# Patient Record
Sex: FEMALE | Race: WHITE | NOT HISPANIC OR LATINO | Employment: UNEMPLOYED | URBAN - METROPOLITAN AREA
[De-identification: names, ages, dates, MRNs, and addresses within clinical notes are randomized per-mention and may not be internally consistent; named-entity substitution may affect disease eponyms.]

---

## 2024-01-31 ENCOUNTER — OFFICE VISIT (OUTPATIENT)
Dept: FAMILY MEDICINE CLINIC | Facility: CLINIC | Age: 64
End: 2024-01-31
Payer: COMMERCIAL

## 2024-01-31 VITALS
OXYGEN SATURATION: 97 % | HEIGHT: 65 IN | BODY MASS INDEX: 34.16 KG/M2 | HEART RATE: 80 BPM | RESPIRATION RATE: 16 BRPM | SYSTOLIC BLOOD PRESSURE: 120 MMHG | DIASTOLIC BLOOD PRESSURE: 86 MMHG | WEIGHT: 205 LBS | TEMPERATURE: 97.3 F

## 2024-01-31 DIAGNOSIS — Z11.59 NEED FOR HEPATITIS C SCREENING TEST: ICD-10-CM

## 2024-01-31 DIAGNOSIS — E66.09 CLASS 1 OBESITY DUE TO EXCESS CALORIES WITH SERIOUS COMORBIDITY AND BODY MASS INDEX (BMI) OF 34.0 TO 34.9 IN ADULT: ICD-10-CM

## 2024-01-31 DIAGNOSIS — R20.2 NUMBNESS AND TINGLING OF FOOT: ICD-10-CM

## 2024-01-31 DIAGNOSIS — R20.0 NUMBNESS AND TINGLING OF FOOT: ICD-10-CM

## 2024-01-31 DIAGNOSIS — E78.5 DYSLIPIDEMIA: ICD-10-CM

## 2024-01-31 DIAGNOSIS — G47.9 SLEEP DISORDER: ICD-10-CM

## 2024-01-31 DIAGNOSIS — R06.83 SNORING: ICD-10-CM

## 2024-01-31 DIAGNOSIS — E55.9 VITAMIN D DEFICIENCY: ICD-10-CM

## 2024-01-31 DIAGNOSIS — R53.82 CHRONIC FATIGUE: Primary | ICD-10-CM

## 2024-01-31 PROCEDURE — 99204 OFFICE O/P NEW MOD 45 MIN: CPT | Performed by: FAMILY MEDICINE

## 2024-01-31 NOTE — PROGRESS NOTES
Subjective     Rehab Mercedes is a 63 y.o. female.     Chief Complaint   Patient presents with    Establish Care    Pain     At the bottom of both feet     Fatigue    Snoring    Numbness       History of Present Illness     To establish care, discuss the followings ;    She is c/o bilateral foot pain to for > 1 year , pain worse with walking , feels burning sensation , Lt > Rt . Saw Pod in [place] , callus Rx done   No h/o DM   No HIV  No h/o HTN  Fells fatigue and tired.   Snoring with sleep disorder for many years.     Vit D defi; was on weekly suppl    Chronic back pain for > 1 year , dose not radiate , 3-5/10, worse when she over works     Obesity; on HD , taking OZEMPIC for wt loss.     BMI is >= 30 and <35. (Class 1 Obesity). The following options were offered after discussion;: exercise counseling/recommendations and nutrition counseling/recommendations      Labs and documentation from previous PCP / consulting physician has been reviewed          The following portions of the patient's history were reviewed and updated as appropriate: allergies, current medications, past family history, past medical history, past social history, past surgical history, and problem list.        Review of Systems   Musculoskeletal:  Positive for arthralgias and back pain.   Neurological:  Positive for numbness. Negative for weakness.       Vitals:    01/31/24 1425   BP: 120/86   Pulse: 80   Resp: 16   Temp: 97.3 °F (36.3 °C)   SpO2: 97%           01/31/24  1425   Weight: 93 kg (205 lb)         Body mass index is 34.11 kg/m².      Current Outpatient Medications   Medication Sig Dispense Refill    atorvastatin (Lipitor) 20 MG tablet Take 1 tablet by mouth Daily. 90 tablet 2    vitamin D (ERGOCALCIFEROL) 1.25 MG (07453 UT) capsule capsule Take 1 capsule by mouth 1 (One) Time Per Week. 12 capsule 1     No current facility-administered medications for this visit.                Objective   Physical Exam  Vitals and nursing note  reviewed.   Constitutional:       General: She is not in acute distress.     Appearance: She is obese. She is not toxic-appearing.   Cardiovascular:      Rate and Rhythm: Normal rate and regular rhythm.      Pulses:           Dorsalis pedis pulses are 1+ on the right side and 1+ on the left side.      Heart sounds: Normal heart sounds. No murmur heard.  Pulmonary:      Effort: Pulmonary effort is normal. No respiratory distress.      Breath sounds: Normal breath sounds. No stridor. No wheezing or rhonchi.   Musculoskeletal:         General: No swelling, tenderness or deformity.      Right foot: Normal range of motion. No deformity, bunion or Charcot foot.      Left foot: Normal range of motion. No deformity, bunion or Charcot foot.   Feet:      Right foot:      Protective Sensation: 5 sites tested.        Skin integrity: Skin integrity normal.      Toenail Condition: Right toenails are normal.      Left foot:      Protective Sensation: 5 sites tested.  5 sites sensed.      Skin integrity: Skin integrity normal.      Toenail Condition: Left toenails are normal.   Skin:     Findings: No bruising, erythema or lesion.   Neurological:      Mental Status: She is alert and oriented to person, place, and time.   Psychiatric:         Mood and Affect: Mood normal.         Behavior: Behavior normal.         Thought Content: Thought content normal.           Assessment & Plan   Diagnoses and all orders for this visit:    1. Chronic fatigue (Primary)  -     Lipid Panel  -     CBC (No Diff)  -     Comprehensive Metabolic Panel  -     Thyroid Peroxidase Antibody  -     TSH Rfx On Abnormal To Free T4  -     Vitamin B12  -     Hemoglobin A1c    2. Numbness and tingling of foot  -     EMG & Nerve Conduction Test; Future    3. Vitamin D deficiency  -     Vitamin D,25-Hydroxy  -     vitamin D (ERGOCALCIFEROL) 1.25 MG (75782 UT) capsule capsule; Take 1 capsule by mouth 1 (One) Time Per Week.  Dispense: 12 capsule; Refill: 1    4. Need  for hepatitis C screening test  -     Hepatitis C Antibody    5. Sleep disorder  -     Ambulatory Referral to Sleep Medicine    6. Snoring  -     Ambulatory Referral to Sleep Medicine    7. Class 1 obesity due to excess calories with serious comorbidity and body mass index (BMI) of 34.0 to 34.9 in adult  Comments:  discussed diet and wt loss    8. Dyslipidemia  -     atorvastatin (Lipitor) 20 MG tablet; Take 1 tablet by mouth Daily.  Dispense: 90 tablet; Refill: 2      Addendum;  Labs showed elevated TC AND LDL with low Vit D   Discussed with pt about diet , wt loss  Will start on weekly vit d and daily lipitor     Patient was given instructions and counseling regarding her condition or for health maintenance advice.   Please see specific information pulled into the AVS if appropriate.       I have fully discussed the nature of the medical condition(s) risks, complications, management, safe and proper use of medications.   Encouraged medication compliance and the importance of keeping scheduled follow up appointments with me and any other providers.    Patient instructed to follow up with our office for results on any labs/imaging ordered during this visit.    Home care discussed  All questions answered  Patient verbalizes understanding and agrees to treatment plan.     Follow up: Return in about 2 months (around 3/31/2024) for physical.

## 2024-02-01 LAB
25(OH)D3+25(OH)D2 SERPL-MCNC: 19.7 NG/ML (ref 30–100)
ALBUMIN SERPL-MCNC: 4.4 G/DL (ref 3.9–4.9)
ALBUMIN/GLOB SERPL: 1.6 {RATIO} (ref 1.2–2.2)
ALP SERPL-CCNC: 124 IU/L (ref 44–121)
ALT SERPL-CCNC: 31 IU/L (ref 0–32)
AST SERPL-CCNC: 18 IU/L (ref 0–40)
BILIRUB SERPL-MCNC: 0.2 MG/DL (ref 0–1.2)
BUN SERPL-MCNC: 14 MG/DL (ref 8–27)
BUN/CREAT SERPL: 19 (ref 12–28)
CALCIUM SERPL-MCNC: 9.7 MG/DL (ref 8.7–10.3)
CHLORIDE SERPL-SCNC: 103 MMOL/L (ref 96–106)
CHOLEST SERPL-MCNC: 238 MG/DL (ref 100–199)
CO2 SERPL-SCNC: 23 MMOL/L (ref 20–29)
CREAT SERPL-MCNC: 0.72 MG/DL (ref 0.57–1)
EGFRCR SERPLBLD CKD-EPI 2021: 93 ML/MIN/1.73
ERYTHROCYTE [DISTWIDTH] IN BLOOD BY AUTOMATED COUNT: 12.9 % (ref 11.7–15.4)
GLOBULIN SER CALC-MCNC: 2.8 G/DL (ref 1.5–4.5)
GLUCOSE SERPL-MCNC: 80 MG/DL (ref 70–99)
HBA1C MFR BLD: 5.8 % (ref 4.8–5.6)
HCT VFR BLD AUTO: 40.6 % (ref 34–46.6)
HCV IGG SERPL QL IA: NON REACTIVE
HDLC SERPL-MCNC: 46 MG/DL
HGB BLD-MCNC: 13.2 G/DL (ref 11.1–15.9)
LDLC SERPL CALC-MCNC: 161 MG/DL (ref 0–99)
MCH RBC QN AUTO: 28.4 PG (ref 26.6–33)
MCHC RBC AUTO-ENTMCNC: 32.5 G/DL (ref 31.5–35.7)
MCV RBC AUTO: 87 FL (ref 79–97)
PLATELET # BLD AUTO: 192 X10E3/UL (ref 150–450)
POTASSIUM SERPL-SCNC: 4.9 MMOL/L (ref 3.5–5.2)
PROT SERPL-MCNC: 7.2 G/DL (ref 6–8.5)
RBC # BLD AUTO: 4.65 X10E6/UL (ref 3.77–5.28)
SODIUM SERPL-SCNC: 141 MMOL/L (ref 134–144)
THYROPEROXIDASE AB SERPL-ACNC: 9 IU/ML (ref 0–34)
TRIGL SERPL-MCNC: 170 MG/DL (ref 0–149)
TSH SERPL DL<=0.005 MIU/L-ACNC: 1.2 UIU/ML (ref 0.45–4.5)
VIT B12 SERPL-MCNC: 1379 PG/ML (ref 232–1245)
VLDLC SERPL CALC-MCNC: 31 MG/DL (ref 5–40)
WBC # BLD AUTO: 7.2 X10E3/UL (ref 3.4–10.8)

## 2024-02-01 RX ORDER — ATORVASTATIN CALCIUM 20 MG/1
20 TABLET, FILM COATED ORAL DAILY
Qty: 90 TABLET | Refills: 2 | Status: SHIPPED | OUTPATIENT
Start: 2024-02-01

## 2024-02-01 RX ORDER — ERGOCALCIFEROL 1.25 MG/1
50000 CAPSULE ORAL WEEKLY
Qty: 12 CAPSULE | Refills: 1 | Status: SHIPPED | OUTPATIENT
Start: 2024-02-01

## 2024-03-01 ENCOUNTER — TELEPHONE (OUTPATIENT)
Dept: FAMILY MEDICINE CLINIC | Facility: CLINIC | Age: 64
End: 2024-03-01
Payer: COMMERCIAL

## 2024-03-01 NOTE — TELEPHONE ENCOUNTER
[facility] representative calling to request an order for a bilateral diagnostic mammogram with ultrasound, biopsy if needed for right breast mass of lower quadrant. Please fax order to [phone].

## 2024-03-04 ENCOUNTER — TELEPHONE (OUTPATIENT)
Dept: FAMILY MEDICINE CLINIC | Facility: CLINIC | Age: 64
End: 2024-03-04
Payer: COMMERCIAL

## 2024-03-04 DIAGNOSIS — R92.8 ABNORMAL MAMMOGRAM: Primary | ICD-10-CM

## 2024-03-04 NOTE — TELEPHONE ENCOUNTER
Requesting bilat diagnostic Mammo with Ultra sound and biopsy if needed due to a rt breast mass in the lower quadrant.    Please fax to [phone]

## 2024-03-12 ENCOUNTER — OFFICE VISIT (OUTPATIENT)
Dept: FAMILY MEDICINE CLINIC | Facility: CLINIC | Age: 64
End: 2024-03-12
Payer: COMMERCIAL

## 2024-03-12 VITALS
SYSTOLIC BLOOD PRESSURE: 116 MMHG | RESPIRATION RATE: 16 BRPM | DIASTOLIC BLOOD PRESSURE: 82 MMHG | OXYGEN SATURATION: 96 % | HEIGHT: 65 IN | WEIGHT: 205 LBS | HEART RATE: 60 BPM | BODY MASS INDEX: 34.16 KG/M2 | TEMPERATURE: 97.3 F

## 2024-03-12 DIAGNOSIS — L03.113 CELLULITIS OF RIGHT UPPER EXTREMITY: Primary | ICD-10-CM

## 2024-03-12 PROCEDURE — 99213 OFFICE O/P EST LOW 20 MIN: CPT | Performed by: FAMILY MEDICINE

## 2024-03-12 PROCEDURE — 1160F RVW MEDS BY RX/DR IN RCRD: CPT | Performed by: FAMILY MEDICINE

## 2024-03-12 PROCEDURE — 1159F MED LIST DOCD IN RCRD: CPT | Performed by: FAMILY MEDICINE

## 2024-03-12 RX ORDER — AMOXICILLIN 500 MG/1
500 CAPSULE ORAL 2 TIMES DAILY
Qty: 20 CAPSULE | Refills: 0 | Status: SHIPPED | OUTPATIENT
Start: 2024-03-12

## 2024-03-12 NOTE — PROGRESS NOTES
[facility] 63 [facility]     Chief Complaint   Patient presents with    Pain     Right upper arm x 2 weeks. Stabbing pain.        History of Present Illness     C/o Rt arm pain for 2 weeks mainly on the forearm, no h/o fall or trauma.  She noticed redness with swelling 3 days ago. Hurts and warm to touch.       The following portions of the patient's history were reviewed and updated as appropriate: allergies, current medications, past family history, past medical history, past social history, past surgical history, and problem list.        Review of Systems   Musculoskeletal:  Positive for myalgias.   Skin:  Positive for color change.       Vitals:    03/12/24 1318   BP: 116/82   Pulse: 60   Resp: 16   Temp: 97.3 °F (36.3 °C)   SpO2: 96%           03/12/24  1318   Weight: 93 kg (205 lb)         Body mass index is 34.11 kg/m².      Current Outpatient Medications   Medication Sig Dispense Refill    atorvastatin (Lipitor) 20 MG tablet Take 1 tablet by mouth Daily. 90 tablet 2    vitamin D (ERGOCALCIFEROL) 1.25 MG (25102 UT) capsule capsule Take 1 capsule by mouth 1 (One) Time Per Week. 12 capsule 1    amoxicillin (AMOXIL) 500 MG capsule Take 1 capsule by mouth 2 (Two) Times a Day. 20 capsule 0     No current facility-administered medications for this visit.                Objective   Physical Exam  Vitals and nursing note reviewed.   Constitutional:       General: She is not in acute distress.     Appearance: She is not toxic-appearing.   Skin:     Findings: Erythema present.      Comments: Swelling with redness of the rt forearm  With TTP   Neurological:      Mental Status: She is alert and oriented to person, place, and time.   Psychiatric:         Mood and Affect: Mood normal.         Behavior: Behavior normal.         Thought Content: Thought content normal.           Assessment & Plan   Diagnoses and all orders for this visit:    1. Cellulitis of right upper extremity (Primary)  -      amoxicillin (AMOXIL) 500 MG capsule; Take 1 capsule by mouth 2 (Two) Times a Day.  Dispense: 20 capsule; Refill: 0      Patient was given instructions and counseling regarding her condition or for health maintenance advice.   Please see specific information pulled into the AVS if appropriate.       I have fully discussed the nature of the medical condition(s) risks, complications, management, safe and proper use of medications.   Pt stated no allergy to the above prescribed medication.  I have discussed the SIDE EFFECT OF MEDICATION and importance TO report any side effect , the patient expressed good understanding.  Encouraged medication compliance and the importance of keeping scheduled follow up appointments with me and any other providers.    Patient instructed to follow up with our office for results on any labs/imaging ordered during this visit.    Home care discussed  All questions answered  Patient verbalizes understanding and agrees to treatment plan.     Follow up: Return for keep appoin in April .

## 2024-03-14 ENCOUNTER — OFFICE VISIT (OUTPATIENT)
Dept: SLEEP MEDICINE | Facility: HOSPITAL | Age: 64
End: 2024-03-14
Payer: COMMERCIAL

## 2024-03-14 VITALS
SYSTOLIC BLOOD PRESSURE: 105 MMHG | OXYGEN SATURATION: 97 % | WEIGHT: 203 LBS | BODY MASS INDEX: 33.82 KG/M2 | HEIGHT: 65 IN | DIASTOLIC BLOOD PRESSURE: 75 MMHG | HEART RATE: 63 BPM

## 2024-03-14 DIAGNOSIS — G47.8 NON-RESTORATIVE SLEEP: ICD-10-CM

## 2024-03-14 DIAGNOSIS — E66.9 CLASS 1 OBESITY: ICD-10-CM

## 2024-03-14 DIAGNOSIS — R06.83 SNORING: ICD-10-CM

## 2024-03-14 DIAGNOSIS — G47.19 EXCESSIVE DAYTIME SLEEPINESS: ICD-10-CM

## 2024-03-14 DIAGNOSIS — G47.30 OBSERVED SLEEP APNEA: Primary | ICD-10-CM

## 2024-03-14 PROBLEM — E66.811 CLASS 1 OBESITY: Status: ACTIVE | Noted: 2024-03-14

## 2024-03-14 PROCEDURE — G0463 HOSPITAL OUTPT CLINIC VISIT: HCPCS

## 2024-03-14 NOTE — PROGRESS NOTES
[facility]  [address]  [address]  [place] [address]  Phone   Fax       Rehab Mercedes  [id number]   1960  63 y.o.  female      PCP:Mary Tom MD    Type of service: Initial New Patient Office Visit  Date of service: 3/14/2024    Chief Complaint   Patient presents with    Witnessed Apnea    Snoring    Non-restorative Sleep    Fatigue    Dry Mouth    Daytime Sleepiness    Obesity       History of present illness;  Rehab Mercedes 63 y.o.  is a new patient for me and was seen today for sleep related problems of snoring, non-restorative sleep and witnessed apneas. The symptoms are present for many years and they are persistent in nature.  The snoring is present in all positions and it is loud.  Patient has no prior surgery namely tonsillectomy, nasal surgery and UPPP.     Patient is accompanied by her her  who also gives the history and speaks English.  They are from [place] patient understands English if you speak to her slowly.  But the  is understands and speaks English.    Patient gives the following sleep history.  Sleep schedule:  Bedtime: 1 AM  Wake time: 10 AM  Normally takes about less than 30 minutes to fall asleep  Average hours of sleep 7-9  Number of naps per day none  Symptoms  In addition to snoring, nonrestorative sleep and witnessed apneas patient gives the following associated symptoms.  Have you ever awakened gasping for breath, coughing, choking: Yes   Change in weight,  No   Morning headaches  Yes   Awaken with a sore throat or dry mouth  Yes   Leg jerking at night:  No   Crawly feeling/urge sensation to move in the legs: No   Teeth grinding:Yes   Have you ever awakened at night with a sour taste or burning sensation in your chest:  Yes   Do you have muscle weakness with laughing or anger or sleep paralysis:  No   Have you ever felt paralyzed while going to sleep or waking up:  No   Sleepwalking, nightmares, No  "  Nocturia (urination at night): 2 times per night  Memory Problem:No     Past medical history: (Relevant to sleep medicine)  Acid reflux  Vitamin D deficiency  Hyperlipidemia    Medications are reviewed by me and documented in the encounter  Allergies reviewed and documented in encounter    Social history:  Do you drive a commercial vehicle:  No   Shift work:  No   Tobacco use:  No   Alcohol use:  0 per week  Caffeinated drinks: 3    FAMILY HISTORY (Your mother, father, brothers and sisters) (relevant to sleep medicine)  Sleep apnea Father, mother and brother  Obesity mother    REVIEW OF SYSTEMS.  Full review of systems available on the intake form which is scanned in the media tab.  The relevant positive are noted below  Daytime excessive sleepiness with [place] Sleepiness Scale :Total score: 8   Snoring  Fatigue  Frequent urination  Heartburn      Physical exam:  Vitals:    03/14/24 1100   BP: 105/75   Pulse: 63   SpO2: 97%   Weight: 92.1 kg (203 lb)   Height: 165.1 cm (65\")    Body mass index is 33.78 kg/m². Neck Circumference: 14 inches  Nose: no nasal septal defects or deviation and the nasal passages are clear, no nasal polyps,  Throat: tonsils are not enlarged, tongue normal, oral airway Mallampati class 3  NECK:Neck Circumference: 14 inches, trachea is in the midline, thyroid not enlarged  RESPIRATORY SYSTEM: Breath sounds are equal on both sides, there are no wheezes   CARDIOVASULAR SYSTEM: Heart sounds are regular rhythm and sparkle rate, no edema  EXTREMITES: No cyanosis, clubbing  NEUROLOGICAL SYSTEM: Oriented x 3, no gross motor defects, gait normal    Office notes from care team reviewed. Office note dated January 31, 2024,reviewed  Labs reviewed.  TSH Results:  TSH          1/31/2024    15:09   TSH   TSH 1.200       Most Recent A1C          1/31/2024    15:09   HGBA1C Most Recent   Hemoglobin A1C 5.8         Assessment and plan:  Witnessed apnea (R06.81) patient's symptoms and examination is consistent " with sleep apnea (G47.30)  I have talked to the patient about the signs and symptoms of sleep apnea. In addition, I have also discussed pathophysiology of sleep apnea.  I also discussed the complications of untreated sleep apnea including effects on hypertension, diabetes mellitus and nonrestorative sleep with hypersomnia which can increase risk for motor vehicle accidents.  Untreated sleep apnea is also a risk factor for development of atrial fibrillation, pulmonary hypertension, insulin resistance and stroke.  Discussed in detail of various testing methods including home-based and lab based sleep studies.  Based on history and physical examination the most appropriate study is home sleep test.  The order for the sleep study is placed in [facility].  The test will be scheduled after approval from insurance. Treatment and management will be discussed after the test is completed.  Patient was given opportunity to ask questions and all the questions were answered.   Snoring (R06.83) snoring is the sound created by turbulent airflow vibrating upper airway soft tissue.  I have also discussed factors affecting snoring including sleep deprivation, sleeping on the back and alcohol ingestion. To minimize snoring, patient is advised to have adequate sleep, sleep on the side and avoid alcohol and sedative medications before bedtime  Daytime excessive sleepiness .  It was assessed with [place] Sleepiness Scale of Total score: 8.  There are many causes for daytime excessive sleepiness including sleep depression, shiftwork syndrome, depression and other medical disorders including heart, kidney and liver failure.  The most serious cause of excessive sleepiness is due to neurological conditions like narcolepsy/cataplexy.  But the most common cause of excessive sleepiness is due to sleep apnea with frequent awakenings during sleep time.  I have discussed safety of driving and to remain vigilant while driving.  Obesity 1, with BMI Body  mass index is 33.78 kg/m².. I have discussed the relationship between weight and sleep apnea.There is direct correlation between weight and severity of sleep apnea.  Weight reduction is encouraged, as it is going to reduce the severity of sleep apnea. I have also discussed with the patient diet and exercise to achieve ideal body weight  GERD,   Return for 31 to 90 days after PAP setup with down load..  Patient's questions were answered.      3/14/2024  Franco Siegel MD  Sleep Medicine  Medical Director  [facility]: [facility] sleep [facility]

## 2024-03-28 ENCOUNTER — HOSPITAL ENCOUNTER (OUTPATIENT)
Dept: SLEEP MEDICINE | Facility: HOSPITAL | Age: 64
End: 2024-03-28
Payer: COMMERCIAL

## 2024-03-28 DIAGNOSIS — G47.19 EXCESSIVE DAYTIME SLEEPINESS: ICD-10-CM

## 2024-03-28 DIAGNOSIS — E66.9 CLASS 1 OBESITY: ICD-10-CM

## 2024-03-28 DIAGNOSIS — G47.8 NON-RESTORATIVE SLEEP: ICD-10-CM

## 2024-03-28 DIAGNOSIS — G47.30 OBSERVED SLEEP APNEA: ICD-10-CM

## 2024-03-28 DIAGNOSIS — R06.83 SNORING: ICD-10-CM

## 2024-03-28 PROCEDURE — 95806 SLEEP STUDY UNATT&RESP EFFT: CPT

## 2024-04-01 ENCOUNTER — OFFICE VISIT (OUTPATIENT)
Dept: FAMILY MEDICINE CLINIC | Facility: CLINIC | Age: 64
End: 2024-04-01
Payer: COMMERCIAL

## 2024-04-01 VITALS
HEIGHT: 65 IN | HEART RATE: 64 BPM | WEIGHT: 203 LBS | BODY MASS INDEX: 33.82 KG/M2 | RESPIRATION RATE: 16 BRPM | SYSTOLIC BLOOD PRESSURE: 102 MMHG | TEMPERATURE: 97.1 F | DIASTOLIC BLOOD PRESSURE: 80 MMHG | OXYGEN SATURATION: 97 %

## 2024-04-01 DIAGNOSIS — E78.5 DYSLIPIDEMIA: ICD-10-CM

## 2024-04-01 DIAGNOSIS — E55.9 VITAMIN D DEFICIENCY: ICD-10-CM

## 2024-04-01 DIAGNOSIS — L03.113 CELLULITIS OF RIGHT UPPER EXTREMITY: Primary | ICD-10-CM

## 2024-04-01 DIAGNOSIS — E66.09 CLASS 1 OBESITY DUE TO EXCESS CALORIES WITH SERIOUS COMORBIDITY AND BODY MASS INDEX (BMI) OF 34.0 TO 34.9 IN ADULT: ICD-10-CM

## 2024-04-01 PROCEDURE — 99214 OFFICE O/P EST MOD 30 MIN: CPT | Performed by: FAMILY MEDICINE

## 2024-04-01 RX ORDER — ATORVASTATIN CALCIUM 20 MG/1
20 TABLET, FILM COATED ORAL NIGHTLY
Qty: 90 TABLET | Refills: 2 | Status: SHIPPED | OUTPATIENT
Start: 2024-04-01

## 2024-04-01 RX ORDER — ERGOCALCIFEROL 1.25 MG/1
50000 CAPSULE ORAL WEEKLY
Qty: 12 CAPSULE | Refills: 1 | Status: SHIPPED | OUTPATIENT
Start: 2024-04-01

## 2024-04-01 NOTE — PROGRESS NOTES
[facility]     Rehab Mercedes is a 63 y.o. female.     Chief Complaint   Patient presents with    Cellulitis of right upper extremity      F/u, still hurt  pt stated she haven't got appt with the clinic she supposed to go to. Patient is fasting also.    Hyperlipidemia    Obesity       History of Present Illness     F/u on Rt arm cellulitis  She finished the Abx , feels better , no new concern     Vit d defi; labs showed low vit d , on weekly supl     HLD; eats HD , doing walking . Doing well on statin , no S/E reported     Obesity; eats HD , do walking. On ozempic 1 mg/weekly , she usually gets it from [place] /cheaper       The following portions of the patient's history were reviewed and updated as appropriate: allergies, current medications, past family history, past medical history, past social history, past surgical history, and problem list.        Review of Systems   Cardiovascular:  Negative for chest pain.       Vitals:    04/01/24 1452   BP: 102/80   Pulse: 64   Resp: 16   Temp: 97.1 °F (36.2 °C)   SpO2: 97%           04/01/24  1452   Weight: 92.1 kg (203 lb)         Body mass index is 33.78 kg/m².      Current Outpatient Medications   Medication Sig Dispense Refill    atorvastatin (Lipitor) 20 MG tablet Take 1 tablet by mouth Every Night. 90 tablet 2    vitamin D (ERGOCALCIFEROL) 1.25 MG (07754 UT) capsule capsule Take 1 capsule by mouth 1 (One) Time Per Week. 12 capsule 1     No current facility-administered medications for this visit.                Objective   Physical Exam  Vitals and nursing note reviewed.   Constitutional:       General: She is not in acute distress.     Appearance: She is not toxic-appearing.   Cardiovascular:      Rate and Rhythm: Normal rate and regular rhythm.      Heart sounds: Normal heart sounds. No murmur heard.  Pulmonary:      Effort: Pulmonary effort is normal. No respiratory distress.      Breath sounds: Normal breath sounds. No stridor. No wheezing or rhonchi.   Skin:      Findings: No lesion.   Neurological:      Mental Status: She is alert and oriented to person, place, and time.   Psychiatric:         Mood and Affect: Mood normal.         Behavior: Behavior normal.         Thought Content: Thought content normal.           Assessment & Plan   Diagnoses and all orders for this visit:    1. Cellulitis of right upper extremity (Primary)  Comments:  resolved    2. Dyslipidemia  Comments:  due for labs   continue HD + statin  Orders:  -     atorvastatin (Lipitor) 20 MG tablet; Take 1 tablet by mouth Every Night.  Dispense: 90 tablet; Refill: 2    3. Vitamin D deficiency  Comments:  stable, continue weekly suppl  Orders:  -     vitamin D (ERGOCALCIFEROL) 1.25 MG (91330 UT) capsule capsule; Take 1 capsule by mouth 1 (One) Time Per Week.  Dispense: 12 capsule; Refill: 1    4. Class 1 obesity due to excess calories with serious comorbidity and body mass index (BMI) of 34.0 to 34.9 in adult  Comments:  Discussed lifestyle modification , wt loss, eating healthy , daily exercise   continue ozempic  sample provided           Patient was given instructions and counseling regarding her condition or for health maintenance advice.   Please see specific information pulled into the AVS if appropriate.       I have fully discussed the nature of the medical condition(s) risks, complications, management, safe and proper use of medications.   Encouraged medication compliance and the importance of keeping scheduled follow up appointments with me and any other providers.    Patient instructed to follow up with our office for results on any labs/imaging ordered during this visit.    Home care discussed  All questions answered  Patient verbalizes understanding and agrees to treatment plan.     Follow up: Return in about 3 months (around 7/1/2024) for physical.

## 2024-04-05 DIAGNOSIS — G47.33 OSA (OBSTRUCTIVE SLEEP APNEA): Primary | ICD-10-CM

## 2024-04-05 DIAGNOSIS — R06.83 SNORING: ICD-10-CM

## 2024-04-09 ENCOUNTER — TELEPHONE (OUTPATIENT)
Dept: SLEEP MEDICINE | Facility: HOSPITAL | Age: 64
End: 2024-04-09
Payer: COMMERCIAL

## 2024-04-09 NOTE — TELEPHONE ENCOUNTER
..Spoke with patiens son about sleep study results , sending orders to [facility], compliance follow up schedule5/23/24.

## 2024-04-15 ENCOUNTER — TELEPHONE (OUTPATIENT)
Dept: SLEEP MEDICINE | Facility: HOSPITAL | Age: 64
End: 2024-04-15
Payer: COMMERCIAL

## 2024-04-15 NOTE — TELEPHONE ENCOUNTER
Received a phone call from [employer]. DME spoke with pt and pt is out of town and will not be back for a month. Pt will be set up once pt is back in town.

## 2024-04-15 NOTE — TELEPHONE ENCOUNTER
Pt has not herd anything from [employer]  I called [facility] and livan said she should be getting a call today or tomorrow.

## 2024-05-23 ENCOUNTER — TELEPHONE (OUTPATIENT)
Dept: FAMILY MEDICINE CLINIC | Facility: CLINIC | Age: 64
End: 2024-05-23
Payer: COMMERCIAL

## 2024-06-12 ENCOUNTER — OFFICE VISIT (OUTPATIENT)
Dept: FAMILY MEDICINE CLINIC | Facility: CLINIC | Age: 64
End: 2024-06-12
Payer: COMMERCIAL

## 2024-06-12 VITALS
TEMPERATURE: 97.3 F | OXYGEN SATURATION: 97 % | DIASTOLIC BLOOD PRESSURE: 80 MMHG | WEIGHT: 201 LBS | SYSTOLIC BLOOD PRESSURE: 120 MMHG | RESPIRATION RATE: 16 BRPM | BODY MASS INDEX: 33.49 KG/M2 | HEART RATE: 55 BPM | HEIGHT: 65 IN

## 2024-06-12 DIAGNOSIS — Z78.0 MENOPAUSE: ICD-10-CM

## 2024-06-12 DIAGNOSIS — N39.46 MIXED STRESS AND URGE URINARY INCONTINENCE: ICD-10-CM

## 2024-06-12 DIAGNOSIS — M79.89 LEG SWELLING: ICD-10-CM

## 2024-06-12 DIAGNOSIS — Z00.00 ENCOUNTER FOR ANNUAL PHYSICAL EXAM: Primary | ICD-10-CM

## 2024-06-12 PROCEDURE — 99397 PER PM REEVAL EST PAT 65+ YR: CPT | Performed by: FAMILY MEDICINE

## 2024-06-12 PROCEDURE — 1160F RVW MEDS BY RX/DR IN RCRD: CPT | Performed by: FAMILY MEDICINE

## 2024-06-12 PROCEDURE — 1159F MED LIST DOCD IN RCRD: CPT | Performed by: FAMILY MEDICINE

## 2024-06-12 RX ORDER — FUROSEMIDE 20 MG/1
10 TABLET ORAL DAILY
Qty: 30 TABLET | Refills: 1 | Status: SHIPPED | OUTPATIENT
Start: 2024-06-12

## 2024-06-12 NOTE — PROGRESS NOTES
Patient Care Team:  Mary Tom MD as PCP - General (Urgent Care)     Chief complaint: Patient is in today for a physical          Patient is a 64 y.o. female who presents for her yearly physical exam.     HPI     Due to language barrier, an Slovak  was present during the history-taking and subsequent discussion (and for part of the physical exam) with this patient.      Eating HD  .   Exercising routinely.   Immunizations: reviewed and discussed.   H/o fall , hurst her ant chest wall, pain on/off, worse with movement , improving    C/o bilateral Leg swelling   On ozempic 1 mg /weekly for wt loss.   Worsening urinary incont    Health maintenance/lifestyle:  Immunization History   Administered Date(s) Administered    COVID-19 (PFIZER) Purple Cap Monovalent 01/21/2021, 02/15/2021         HM;  Colorectal Screening:  had CSC 5 [place] ago in [place] , normal   Pap:  due   Mammogram:  UTD       PHQ-2 Depression Screening  Little interest or pleasure in doing things? 0-->not at all   Feeling down, depressed, or hopeless? 0-->not at all   PHQ-2 Total Score 0         Social History     Tobacco Use   Smoking Status Never   Smokeless Tobacco Never     Social History     Substance and Sexual Activity   Alcohol Use Never         Review of Systems   Cardiovascular:  Negative for chest pain.   Genitourinary:  Positive for urinary incontinence. Negative for urgency.         History  History reviewed. No pertinent past medical history.   History reviewed. No pertinent surgical history.   No Known Allergies   History reviewed. No pertinent family history.  Social History     Socioeconomic History    Marital status:    Tobacco Use    Smoking status: Never    Smokeless tobacco: Never   Vaping Use    Vaping status: Never Used   Substance and Sexual Activity    Alcohol use: Never    Drug use: Never        Current Outpatient Medications:     atorvastatin (Lipitor) 20 MG tablet, Take 1 tablet by mouth Every  "Night., Disp: 90 tablet, Rfl: 2    vitamin D (ERGOCALCIFEROL) 1.25 MG (30122 UT) capsule capsule, Take 1 capsule by mouth 1 (One) Time Per Week., Disp: 12 capsule, Rfl: 1    furosemide (Lasix) 20 MG tablet, Take 0.5 tablets by mouth Daily., Disp: 30 tablet, Rfl: 1                  /80   Pulse 55   Temp 97.3 °F (36.3 °C)   Resp 16   Ht 165.1 cm (65\")   Wt 91.2 kg (201 lb)   SpO2 97%   BMI 33.45 kg/m²       Physical Exam  Vitals and nursing note reviewed.   Constitutional:       General: She is not in acute distress.     Appearance: She is obese. She is not ill-appearing, toxic-appearing or diaphoretic.   HENT:      Head: Normocephalic.      Right Ear: Tympanic membrane, ear canal and external ear normal.      Left Ear: Tympanic membrane, ear canal and external ear normal.      Nose: Nose normal. No congestion or rhinorrhea.      Mouth/Throat:      Mouth: Mucous membranes are moist.      Pharynx: Oropharynx is clear. No oropharyngeal exudate or posterior oropharyngeal erythema.   Eyes:      General: No scleral icterus.        Right eye: No discharge.         Left eye: No discharge.      Extraocular Movements: Extraocular movements intact.      Conjunctiva/sclera: Conjunctivae normal.      Pupils: Pupils are equal, round, and reactive to light.   Neck:      Comments: No enlarged thyroid      Cardiovascular:      Rate and Rhythm: Normal rate and regular rhythm.      Heart sounds: Normal heart sounds. No murmur heard.     No friction rub. No gallop.   Pulmonary:      Effort: Pulmonary effort is normal. No respiratory distress.      Breath sounds: Normal breath sounds. No stridor. No wheezing, rhonchi or rales.   Abdominal:      General: Bowel sounds are normal. There is no distension.      Palpations: Abdomen is soft. There is no mass.      Tenderness: There is no abdominal tenderness. There is no right CVA tenderness, left CVA tenderness, guarding or rebound.      Hernia: No hernia is present. "   Musculoskeletal:         General: Normal range of motion.      Cervical back: Normal range of motion and neck supple.      Right lower leg: No edema.      Left lower leg: No edema.   Lymphadenopathy:      Cervical: No cervical adenopathy.   Skin:     General: Skin is warm.      Coloration: Skin is not jaundiced or pale.      Findings: No erythema or rash.   Neurological:      General: No focal deficit present.      Mental Status: She is alert and oriented to person, place, and time.      Cranial Nerves: No cranial nerve deficit.      Sensory: No sensory deficit.      Motor: No weakness.      Coordination: Coordination normal.      Gait: Gait normal.      Deep Tendon Reflexes: Reflexes normal.   Psychiatric:         Mood and Affect: Mood normal.         Behavior: Behavior normal.         Thought Content: Thought content normal.         Judgment: Judgment normal.                   Diagnoses and all orders for this visit:    1. Encounter for annual physical exam (Primary)    2. Menopause  -     DEXA Bone Density Axial; Future    3. Leg swelling  Comments:  WILL START ON LASIX PRN  Orders:  -     furosemide (Lasix) 20 MG tablet; Take 0.5 tablets by mouth Daily.  Dispense: 30 tablet; Refill: 1    4. Mixed stress and urge urinary incontinence  -     Ambulatory Referral to Physical Therapy            -Age and sex appropriate physical exam performed and documented.   -Updated past medical, family, social and surgical histories as well as allergies and care team list.   -Addressed care gaps listed in the medical record.  -advised to eat healthy diet, do daily exercise   - discussed and updates preventive screening measures            Follow up: Return in about 3 months (around 9/12/2024) for follow up on current illness, do labs .  Plan of care discussed with pt. They verbalized understanding and agreement.     Mary Tom MD   6/12/2024   12:50 EDT

## 2024-06-14 ENCOUNTER — PROCEDURE VISIT (OUTPATIENT)
Dept: NEUROLOGY | Facility: CLINIC | Age: 64
End: 2024-06-14
Payer: COMMERCIAL

## 2024-06-14 DIAGNOSIS — R20.0 NUMBNESS AND TINGLING OF FOOT: ICD-10-CM

## 2024-06-14 DIAGNOSIS — R20.2 NUMBNESS AND TINGLING OF FOOT: ICD-10-CM

## 2024-06-19 DIAGNOSIS — R20.2 NUMBNESS AND TINGLING OF FOOT: Primary | ICD-10-CM

## 2024-06-19 DIAGNOSIS — R20.0 NUMBNESS AND TINGLING OF FOOT: Primary | ICD-10-CM

## 2024-06-19 DIAGNOSIS — M77.50 BONE SPUR OF FOOT: ICD-10-CM

## 2024-07-02 ENCOUNTER — HOSPITAL ENCOUNTER (OUTPATIENT)
Dept: BONE DENSITY | Facility: HOSPITAL | Age: 64
Discharge: HOME | End: 2024-07-02
Admitting: FAMILY MEDICINE
Payer: COMMERCIAL

## 2024-07-02 DIAGNOSIS — Z78.0 MENOPAUSE: ICD-10-CM

## 2024-07-02 PROCEDURE — 77080 DXA BONE DENSITY AXIAL: CPT

## 2024-07-07 DIAGNOSIS — E55.9 VITAMIN D DEFICIENCY: ICD-10-CM

## 2024-07-08 RX ORDER — ERGOCALCIFEROL 1.25 MG/1
50000 CAPSULE ORAL WEEKLY
Qty: 12 CAPSULE | Refills: 1 | Status: SHIPPED | OUTPATIENT
Start: 2024-07-08

## 2024-07-08 NOTE — TELEPHONE ENCOUNTER
Rx Refill Note  Requested Prescriptions     Pending Prescriptions Disp Refills    vitamin D (ERGOCALCIFEROL) 1.25 MG (91169 UT) capsule capsule [Pharmacy Med Name: VITAMIN D2 50,000IU (ERGO) CAP RX] 12 capsule 1     Sig: TAKE 1 CAPSULE BY MOUTH 1 TIME EVERY WEEK      Last office visit with prescribing clinician: 6/12/2024   Last telemedicine visit with prescribing clinician: Visit date not found   Next office visit with prescribing clinician: Visit date not found                         Would you like a call back once the refill request has been completed: [] Yes [] No    If the office needs to give you a call back, can they leave a voicemail: [] Yes [] No    Sarah Wise MA  07/08/24, 07:33 EDT

## 2024-07-25 ENCOUNTER — OFFICE VISIT (OUTPATIENT)
Dept: SLEEP MEDICINE | Facility: HOSPITAL | Age: 64
End: 2024-07-25
Payer: COMMERCIAL

## 2024-07-25 VITALS
HEART RATE: 56 BPM | DIASTOLIC BLOOD PRESSURE: 70 MMHG | WEIGHT: 202.8 LBS | BODY MASS INDEX: 33.79 KG/M2 | SYSTOLIC BLOOD PRESSURE: 106 MMHG | OXYGEN SATURATION: 94 % | HEIGHT: 65 IN

## 2024-07-25 DIAGNOSIS — G47.33 OSA ON CPAP: Primary | ICD-10-CM

## 2024-07-25 DIAGNOSIS — E66.9 CLASS 1 OBESITY: ICD-10-CM

## 2024-07-25 PROBLEM — G47.19 EXCESSIVE DAYTIME SLEEPINESS: Status: RESOLVED | Noted: 2024-03-14 | Resolved: 2024-07-25

## 2024-07-25 PROBLEM — R06.83 SNORING: Status: RESOLVED | Noted: 2024-03-14 | Resolved: 2024-07-25

## 2024-07-25 PROBLEM — G47.8 NON-RESTORATIVE SLEEP: Status: RESOLVED | Noted: 2024-03-14 | Resolved: 2024-07-25

## 2024-07-25 PROCEDURE — 1160F RVW MEDS BY RX/DR IN RCRD: CPT | Performed by: INTERNAL MEDICINE

## 2024-07-25 PROCEDURE — G0463 HOSPITAL OUTPT CLINIC VISIT: HCPCS

## 2024-07-25 PROCEDURE — 1159F MED LIST DOCD IN RCRD: CPT | Performed by: INTERNAL MEDICINE

## 2024-07-25 PROCEDURE — 99213 OFFICE O/P EST LOW 20 MIN: CPT | Performed by: INTERNAL MEDICINE

## 2024-07-25 NOTE — PROGRESS NOTES
"  [facility]  Sleep Medicine   [address]  [address]  [place] [address]  Phone   Fax       SLEEP CLINIC FOLLOW UP PROGRESS NOTE.    Renee Long  [id number]   1960  64 y.o.  female      PCP: Mary Tom MD      Date of visit: 7/25/2024    Chief Complaint   Patient presents with    Sleep Apnea       HPI:  This is a 64 y.o. years old patient is here for the management of obstructive sleep apnea.  Sleep apnea is moderate in severity with a AHI of 19/hr. Patient is using positive airway pressure therapy with auto CPAP and the symptoms of sleep apnea have improved significantly on the therapy. Normally patient goes to bed at 11 PM and wakes up at 9 AM .  The patient wakes up 1 time(s) during the night and has no problem going back to sleep.  Feels refreshed after waking up.     Medications and allergies are reviewed by me and documented in the encounter.     SOCIAL (habits pertaining to sleep medicine)  History tobacco use:No   History of alcohol use: 0 per week  Caffeine use: 2     REVIEW OF SYSTEMS:   Pertaining positive symptoms are:  [place] Sleepiness Scale :Total score: 1       PHYSICAL EXAMINATION:  CONSTITUTIONAL:  Vitals:    07/25/24 1100   BP: 106/70   Pulse: 56   SpO2: 94%   Weight: 92 kg (202 lb 12.8 oz)   Height: 165.1 cm (65\")    Body mass index is 33.75 kg/m².   NOSE: nasal passages are clear, No deformities noted   RESP SYSTEM: Not in any respiratory distress, no chest deformities noted,   CARDIOVASULAR: No edema noted  NEURO: Oriented x 3, gait normal,  Mood and affect appeared appropriate      Data reviewed:  The Smart card downloaded on 7/25/2024 has been reviewed independently by me for compliance and discussed the data with the patient.   Compliance; 96%  More than 4 hr use, 95%  Average use of the device 6 hours and 40-minute per night  Residual AHI: 0.8 /hr (Optimal < 5/hr, Good <10/hr, Adequate reduce by 75% from baseline)  Mask " type: Nasal mask but wants to try nasal cushion.  Device: ResMed  DME: [employer]  I have given her Torie nasal small size to try out and if she likes it then I can send the prescription to [employer]      ASSESSMENT AND PLAN:  Obstructive sleep apnea ( G 47.33).  The symptoms of sleep apnea have improved with the device and the treatment.  Patient's compliance with the device is excellent for treatment of sleep apnea.  I have independently reviewed the smart card down load and discussed with the patient the download data and encouarged the patient to continue to use the device.The residual AHI is acceptable. The device is benefiting the patient and the device is medically necessary.  Without proper control of sleep apnea and good compliance there is a increased risk for hypertension, diabetes mellitus and nonrestorative sleep with hypersomnia which can increase risk for motor vehicle accidents.  Untreated sleep apnea is also a risk factor for development of atrial fibrillation, pulmonary hypertension, insulin resistance and stroke. The patient is also instructed to get the supplies from the DME company and and change them on a regular basis.  A prescription for supplies has been sent to the DME company.  I have also discussed the good sleep hygiene habits and adequate amount of sleep needed for good health.  Obesity  1 with BMI is Body mass index is 33.75 kg/m².. I have discuss the relationship between the weight and sleep apnea. The benefit of weight loss in reducing severity of sleep apnea was discussed. Discussed diet and exercise with the patient to achieve ideal BMI.  Return in about 1 year (around 7/25/2025) for with smart card down load. . Patient's questions were answered.    7/25/2024  Franco Siegel MD  Sleep Medicine.  Medical Director,   [facility] sleep centers.

## 2024-07-30 ENCOUNTER — HOSPITAL ENCOUNTER (OUTPATIENT)
Dept: PHYSICAL THERAPY | Facility: HOSPITAL | Age: 64
Discharge: HOME | End: 2024-07-30
Admitting: FAMILY MEDICINE
Payer: COMMERCIAL

## 2024-07-30 DIAGNOSIS — N81.89 PELVIC FLOOR WEAKNESS: ICD-10-CM

## 2024-07-30 DIAGNOSIS — M62.89 PELVIC FLOOR DYSFUNCTION: ICD-10-CM

## 2024-07-30 DIAGNOSIS — N39.46 MIXED STRESS AND URGE URINARY INCONTINENCE: Primary | ICD-10-CM

## 2024-07-30 PROCEDURE — 97161 PT EVAL LOW COMPLEX 20 MIN: CPT

## 2024-07-30 NOTE — THERAPY EVALUATION
Outpatient Physical Therapy Ortho Initial Evaluation  [facility]     Patient Name: Renee Long  : 1960  MRN: [id number]  Today's Date: 2024      Visit Date: 2024    Patient Active Problem List   Diagnosis    JOSE FRANCISCO on CPAP    Class 1 obesity        No past medical history on file.     No past surgical history on file.    Visit Dx:     ICD-10-CM ICD-9-CM   1. Mixed stress and urge urinary incontinence  N39.46 788.33   2. Pelvic floor dysfunction  M62.89 618.83   3. Pelvic floor weakness  N81.89 618.89          Patient History       Row Name 24 1500             Fall Risk Assessment    Any falls in the past year: No  -RS         Services    Are you currently receiving Home Health services No  -RS         Daily Activities    Primary Language Bulgarian  -RS      Action taken if English not primary language ipad   -RS      Are you able to read Yes  -RS      Are you able to write Yes  -RS      How does patient learn best? Reading;Listening;Pictures/Video  -RS      Pt Participated in POC and Goals Yes  -RS         Safety    Are you being hurt, hit, or frightened by anyone at home or in your life? No  -RS      Are you being neglected by a caregiver No  -RS                User Key  (r) = Recorded By, (t) = Taken By, (c) = Cosigned By      Initials Name Provider Type    RS Rafaela Conway PT Physical Therapist                                 Pelvic Health       Row Name 24 1500             Subjective    Patient Reason for Visit Mixed Incontinence  -RS      Brief Description of Chief Complaint The pt is a 63 yo female who was referred for mixed stress and urge urinary incontinence present about 5 years. She has a hysterectomy about 15 years ago and noticed ZEYNEP after this procedure. She has given birth 3 times but did not notice any urinary incontinence after giving birth. Recently, she has noticed that she has been going to the bathroom more frequently and feels the need to go  selina in case. She goes to the restroom one time per night, she previously went every 2 hours but since she has been treated for sleep apnea it has helped. During the day, she is going every hour if she has coffee/more water and every 2 hours at other times.  -RS      Patient Goals improve urine leakage  -RS         Fall Risk Assessment    Any falls in the past year: No  -RS         Services    Are you currently receiving Home Health services No  -RS         Daily Activities    Primary Language Kyrgyz  -RS      Action taken if English not primary language ipad   -RS      Are you able to read Yes  -RS      Are you able to write Yes  -RS      How does patient learn best? Reading;Listening;Pictures/Video  -RS      Pt Participated in POC and Goals Yes  -RS         Safety    Are you being hurt, hit, or frightened by anyone at home or in your life? No  -RS      Are you being neglected by a caregiver No  -RS         Urinary/Bowel History    Stress incontinence yes  -RS      Urgency yes  -RS      Nocturia (times per night) 1x  -RS      Daytime frequency (hours) every 1-2 hours  -RS         Pregnancy/Sexual History    Number of Pregnancies 3  -RS      Number of Children 3  -RS      Menstruation N/A, hx hysterectomy  -RS      Pain with initial penetration No  -RS      Pain with deep penetration No  -RS         Pelvic Floor Muscle    Patient/Parent/Guardian Consented to Internal Pelvic Floor Exam Yes  -RS      Strength (Right) 3: Squeeze with/without lift  -RS      Strength (Left) 3: Squeeze with/without lift  -RS      Symmetry of Sustained Maximal Contraction Symmetrical  -RS      Endurance (Ability to Hold Maximal Contraction) 6 sec  -RS      # of Reps of Maximal Contractions while Maintaining Endurance and Strength 0 unable to hold 10 seconds  -RS      Fast Contraction (# of 1 sec contractions performed) 5  -RS      External Pelvic Floor Comments cues to avoid compensation from adductors/glutes required initially   -RS         Observations    Perineal Observation Performed? Yes  -RS         Observation of Contraction in Perineum    Anal [place] Present  -RS      Perineal Body Lift Present  -RS         Pelvic Floor    Ability to Isolate Contraction of Pelvic Floor Yes  -RS      Overflow from Adjacent Muscles Gluteus Massimo;Abductors  -RS         Cough    Abdominal Contraction Yes  -RS      Leak None  -RS      Contraction of Pelvic Floor No  -RS      Bulge Yes  -RS                User Key  (r) = Recorded By, (t) = Taken By, (c) = Cosigned By      Initials Name Provider Type    RS Rafaela Conway PT Physical Therapist                    Therapy Education  Education Details: Role of  PF PT, POC, differential diagnosis, initial HEP, expectations, goals, anatomy, role of PF.  Given: HEP  Program: New  How Provided: Verbal, Demonstration, Written  Provided to: Patient  Level of Understanding: Verbalized, Demonstrated      PT OP Goals       Row Name 07/30/24 1500          PT Short Term Goals    STG Date to Achieve 09/13/24  -RS     STG 1 The pt will complete a bladder log to facilitate improved pattern recognition and appropriate fluid intake monitoring/goal setting.  -RS     STG 1 Progress New  -RS     STG 2 The pt will report understanding of the appropriate use of urge suppression techniques to facilitate improved voiding intervals.  -RS     STG 2 Progress New  -RS        Long Term Goals    LTG Date to Achieve 10/28/24  -RS     LTG 1 The pt will report IND and compliant with HEP focused on IND condition management and return to PLOF.  -RS     LTG 1 Progress New  -RS     LTG 2 The pt will report typical voiding intervals at least 2-3 hours on average to facilitate improved participation in daily activities.  -RS     LTG 2 Progress New  -RS     LTG 3 The pt will report at least 60% reduction in frequency/amount of urine lost during cough/sneeze to facilitate improved pressure management.  -RS     LTG 3 Progress New  -RS     LTG 4  The pt will demonstrate OFM strength at least 4/5 for 10 seconds to indicate improved PFM strength and endurance.  -RS     LTG 4 Progress New  -RS        Time Calculation    PT Goal Re-Cert Due Date 10/28/24  -RS               User Key  (r) = Recorded By, (t) = Taken By, (c) = Cosigned By      Initials Name Provider Type    RS Rafaela Conway, PT Physical Therapist                     PT Assessment/Plan       Row Name 07/30/24 1500          PT Assessment    Functional Limitations Limitation in home management;Limitations in community activities;Performance in self-care ADL;Performance in work activities  -RS     Impairments Impaired muscle length;Impaired muscle power;Impaired muscle endurance;Range of motion;Posture;Peripheral nerve integrity;Pain;Muscle strength  -RS     Assessment Comments Rehab CRISTAL Long is a 64 y.o. female referred to physical therapy for mixed urinary incontinence. She presents with a stable clinical presentation, along with no remarkable comorbidities and personal factors of hx hysterectomy and potential communication impairment (utilized virtual  at [facility])  that may impact her progress in the plan of care. Pt presents today with decreased PFM strength, endurance, power, impaired pressure management, impaired PFM  coordination. her signs and symptoms are consistent with referring diagnosis. The previous impairments limit her ability to participate in daily activities without the frequent need to urinate, delay urination when appropriate to maintain continence, cough/sneeze without loss of urine. Pt will benefit from skilled PT to address the previous impairments and return to PLOF.  -RS     Please refer to paper survey for additional self-reported information No  -RS     Rehab Potential Good  -RS     Patient/caregiver participated in establishment of treatment plan and goals Yes  -RS     Patient would benefit from skilled therapy intervention Yes  -RS        PT Plan    PT  Frequency 1x/week  -RS     Predicted Duration of Therapy Intervention (PT) 6-8 sessions  -RS     Planned CPT's? PT RE-EVAL: 74009;PT THER PROC EA 15 MIN: 62715;PT THER ACT EA 15 MIN: 21573;PT MANUAL THERAPY EA 15 MIN: 51512;PT NEUROMUSC RE-EDUCATION EA 15 MIN: 83367;PT GAIT TRAINING EA 15 MIN: 28704;PT SELF CARE/HOME MGMT/TRAIN EA 15: 84392;PT HOT OR COLD PACK TREAT MCARE;PT ELECTRICAL STIM UNATTEND: ;PT TRACTION LUMBAR: 97418;PT EVAL LOW COMPLEXITY: 44607  -RS     PT Plan Comments Review bladder log, educate regarding appropriate fluid intake, urge suppression, basic PFM strengthening  -RS               User Key  (r) = Recorded By, (t) = Taken By, (c) = Cosigned By      Initials Name Provider Type    RS Rafaela Conway PT Physical Therapist                       OP Exercises       Row Name 07/30/24 1500             Subjective    Patient Reason for Visit Mixed Incontinence  -RS      Brief Description of Chief Complaint The pt is a 63 yo female who was referred for mixed stress and urge urinary incontinence present about 5 years. She has a hysterectomy about 15 years ago and noticed ZEYNEP after this procedure. She has given birth 3 times but did not notice any urinary incontinence after giving birth. Recently, she has noticed that she has been going to the bathroom more frequently and feels the need to go jut in case. She goes to the restroom one time per night, she previously went every 2 hours but since she has been treated for sleep apnea it has helped. During the day, she is going every hour if she has coffee/more water and every 2 hours at other times.  -RS      Patient Goals improve urine leakage  -RS         Exercise 1    Exercise Name 1 bladder log instruction  -RS         Exercise 2    Exercise Name 2 long and short PFM contraction- instruction only  -RS                User Key  (r) = Recorded By, (t) = Taken By, (c) = Cosigned By      Initials Name Provider Type    RS Rafaela Conway PT Physical  Therapist                                            Time Calculation:     Start Time: 1452  Stop Time: 1530  Time Calculation (min): 38 min  Untimed Charges  PT Eval/Re-eval Minutes: 38  Total Minutes  Untimed Charges Total Minutes: 38   Total Minutes: 38     Therapy Charges for Today       Code Description Service Date Service Provider Modifiers Qty    75988595380 HC PT EVAL LOW COMPLEXITY 3 7/30/2024 Rafaela Conway, PT GP 1                      Rafaela Conway, PT  7/30/2024

## 2024-09-10 ENCOUNTER — HOSPITAL ENCOUNTER (OUTPATIENT)
Dept: PHYSICAL THERAPY | Facility: HOSPITAL | Age: 64
Discharge: HOME | End: 2024-09-10
Admitting: FAMILY MEDICINE
Payer: COMMERCIAL

## 2024-09-10 DIAGNOSIS — M62.89 PELVIC FLOOR DYSFUNCTION: ICD-10-CM

## 2024-09-10 DIAGNOSIS — N39.46 MIXED STRESS AND URGE URINARY INCONTINENCE: Primary | ICD-10-CM

## 2024-09-10 DIAGNOSIS — N81.89 PELVIC FLOOR WEAKNESS: ICD-10-CM

## 2024-09-10 PROCEDURE — 97110 THERAPEUTIC EXERCISES: CPT

## 2024-09-10 PROCEDURE — 97530 THERAPEUTIC ACTIVITIES: CPT

## 2024-09-10 NOTE — THERAPY PROGRESS REPORT/RE-CERT
"  Outpatient Physical Therapy Ortho Progress Note  [facility]     Patient Name: Yaneliab CRISTAL Long  : 1960  MRN: [id number]  Today's Date: 9/10/2024      Visit Date: 09/10/2024    Visit Dx:    ICD-10-CM ICD-9-CM   1. Mixed stress and urge urinary incontinence  N39.46 788.33   2. Pelvic floor dysfunction  M62.89 618.83   3. Pelvic floor weakness  N81.89 618.89       Patient Active Problem List   Diagnosis    JOSE FRANCISCO on CPAP    Class 1 obesity        No past medical history on file.     No past surgical history on file.                     PT Assessment/Plan       Row Name 09/10/24 1500          PT Assessment    Functional Limitations Limitation in home management;Limitations in community activities;Performance in self-care ADL;Performance in work activities  -RS     Impairments Impaired muscle length;Impaired muscle power;Impaired muscle endurance;Range of motion;Posture;Peripheral nerve integrity;Pain;Muscle strength  -RS     Assessment Comments The pt returns for first follow up session after initial eval reporting slight improvement in urinary urgency and good compliance with initial HEP. She did not complete bladder log however did notice a pattern of urinating frequently after drinking coffee.  She  has partially met 1/2 STG and 0/4 LTG as expected at first follow up session. Education was provided regarding urge suppression techniques, appropriate fluid intake, and avoidance of \"just in case\" urination, pts  present and interpreting throughout session. Initiated basic PFM activation activities and provided cues for breathing, pt with good tolerance overall. Updated HEP and reviewed with pt who reports understanding and remains appropriate for skilled PT.  -RS        PT Plan    PT Plan Comments Consider side steps, review urge supp  -RS               User Key  (r) = Recorded By, (t) = Taken By, (c) = Cosigned By      Initials Name Provider Type    RS Rafaela Conway, PT Physical Therapist         "               OP Exercises       Row Name 09/10/24 1500             Subjective    Subjective Comments pt reports she can hold it a little better, she noticed coffee makes her need to urinate more frequently  -RS         Total Minutes    84178 - PT Therapeutic Exercise Minutes 16  -RS      83644 - PT Therapeutic Activity Minutes 23  -RS         Exercise 1    Exercise Name 1 bladder log verbal review  -RS         Exercise 2    Exercise Name 2 education- appropriate fluid intake- avoiding 2 cups of coffee/pairing with water  -RS         Exercise 3    Exercise Name 3 avoidance of just in case urination  -RS         Exercise 4    Exercise Name 4 education- urge suppression techniques  -RS         Exercise 5    Exercise Name 5 bridge with PFM  -RS      Cueing 5 Verbal;Demo  -RS      Sets 5 2  -RS      Reps 5 10  -RS      Time 5 5  -RS         Exercise 6    Exercise Name 6 clamshell  -RS      Cueing 6 Verbal;Demo  -RS      Reps 6 20  -RS      Time 6 RTB  -RS         Exercise 7    Exercise Name 7 PFM with STS  -RS      Cueing 7 Verbal;Demo  -RS      Reps 7 10  -RS         Exercise 8    Exercise Name 8 seated PFM  -RS      Cueing 8 Verbal;Demo  -RS      Reps 8 10  -RS      Time 8 5  -RS                User Key  (r) = Recorded By, (t) = Taken By, (c) = Cosigned By      Initials Name Provider Type    RS Rafaela Conway, PT Physical Therapist                                  PT OP Goals       Row Name 09/10/24 1400          PT Short Term Goals    STG Date to Achieve 09/13/24  -RS     STG 1 The pt will complete a bladder log to facilitate improved pattern recognition and appropriate fluid intake monitoring/goal setting.  -RS     STG 1 Progress Ongoing  -RS     STG 2 The pt will report understanding of the appropriate use of urge suppression techniques to facilitate improved voiding intervals.  -RS     STG 2 Progress Partially Met  -RS        Long Term Goals    LTG Date to Achieve 10/28/24  -RS     LTG 1 The pt will report IND  and compliant with HEP focused on IND condition management and return to PLOF.  -RS     LTG 1 Progress Ongoing  -RS     LTG 2 The pt will report typical voiding intervals at least 2-3 hours on average to facilitate improved participation in daily activities.  -RS     LTG 2 Progress Ongoing  -RS     LTG 3 The pt will report at least 60% reduction in frequency/amount of urine lost during cough/sneeze to facilitate improved pressure management.  -RS     LTG 3 Progress Ongoing  -RS     LTG 4 The pt will demonstrate OFM strength at least 4/5 for 10 seconds to indicate improved PFM strength and endurance.  -RS     LTG 4 Progress Ongoing  -RS               User Key  (r) = Recorded By, (t) = Taken By, (c) = Cosigned By      Initials Name Provider Type    Rafaela Angeles PT Physical Therapist                    Therapy Education  Education Details: see ex log  Given: HEP  Program: Reinforced, Progressed  How Provided: Verbal, Demonstration, Written  Provided to: Patient  Level of Understanding: Verbalized, Demonstrated              Time Calculation:   Start Time: 1415  Stop Time: 1455  Time Calculation (min): 40 min  Timed Charges  02694 - PT Therapeutic Exercise Minutes: 16  01169 - PT Therapeutic Activity Minutes: 23  Total Minutes  Timed Charges Total Minutes: 39   Total Minutes: 39  Therapy Charges for Today       Code Description Service Date Service Provider Modifiers Qty    33188452935  PT THER PROC EA 15 MIN 9/10/2024 Rafaela Conway, PT GP 1    [id number]  PT THERAPEUTIC ACT EA 15 MIN 9/10/2024 Rafaela Conway, PT GP 2                      Rafaela Conway PT  9/10/2024

## 2024-10-15 ENCOUNTER — TELEPHONE (OUTPATIENT)
Dept: FAMILY MEDICINE CLINIC | Facility: CLINIC | Age: 64
End: 2024-10-15
Payer: COMMERCIAL

## 2025-04-03 ENCOUNTER — TELEPHONE (OUTPATIENT)
Dept: FAMILY MEDICINE CLINIC | Facility: CLINIC | Age: 65
End: 2025-04-03
Payer: COMMERCIAL

## 2025-04-03 DIAGNOSIS — N63.20 MASS OF LEFT BREAST, UNSPECIFIED QUADRANT: Primary | ICD-10-CM

## 2025-04-03 NOTE — TELEPHONE ENCOUNTER
[facility] called, requesting left breast diagnostic Mammogram with US biopsy if needed order. Dx: left breast mass. Location: [facility]. Front office will fax order to [phone], once order is authorized. Please advise.

## 2025-07-24 ENCOUNTER — OFFICE VISIT (OUTPATIENT)
Dept: SLEEP MEDICINE | Facility: HOSPITAL | Age: 65
End: 2025-07-24
Payer: COMMERCIAL

## 2025-07-24 ENCOUNTER — TELEPHONE (OUTPATIENT)
Dept: SLEEP MEDICINE | Facility: HOSPITAL | Age: 65
End: 2025-07-24
Payer: COMMERCIAL

## 2025-07-24 VITALS
WEIGHT: 200 LBS | DIASTOLIC BLOOD PRESSURE: 68 MMHG | HEART RATE: 60 BPM | BODY MASS INDEX: 33.32 KG/M2 | HEIGHT: 65 IN | OXYGEN SATURATION: 95 % | SYSTOLIC BLOOD PRESSURE: 100 MMHG

## 2025-07-24 DIAGNOSIS — G47.33 OSA ON CPAP: Primary | ICD-10-CM

## 2025-07-24 DIAGNOSIS — E66.811 CLASS 1 OBESITY: ICD-10-CM

## 2025-07-24 PROCEDURE — 1160F RVW MEDS BY RX/DR IN RCRD: CPT | Performed by: INTERNAL MEDICINE

## 2025-07-24 PROCEDURE — 99213 OFFICE O/P EST LOW 20 MIN: CPT | Performed by: INTERNAL MEDICINE

## 2025-07-24 PROCEDURE — G0463 HOSPITAL OUTPT CLINIC VISIT: HCPCS

## 2025-07-24 PROCEDURE — 1159F MED LIST DOCD IN RCRD: CPT | Performed by: INTERNAL MEDICINE

## 2025-07-24 NOTE — PROGRESS NOTES
"  [facility]  Sleep Medicine   [address]  [address]  [place] [address]  Phone   Fax       SLEEP CLINIC FOLLOW UP PROGRESS NOTE.    Renee Long  [id number]   1960  65 y.o.  female      PCP: Mary Tom MD      Date of visit: 7/24/2025    Chief Complaint   Patient presents with    Sleep Apnea    Obesity       HPI:  This is a 65 y.o. years old patient is here for the management of obstructive sleep apnea.  Sleep apnea is moderate in severity with a AHI of 19/hr. Patient is using positive airway pressure therapy with auto CPAP and the symptoms of sleep apnea have improved significantly on the therapy. Normally patient goes to bed at 11 PM and wakes up at 9 AM .  The patient wakes up 1 time(s) during the night and has no problem going back to sleep.  Feels refreshed after waking up.     Medications and allergies are reviewed by me and documented in the encounter.     SOCIAL (habits pertaining to sleep medicine)  History tobacco use:No   History of alcohol use: 0 per week  Caffeine use: 2     REVIEW OF SYSTEMS:   Pertaining positive symptoms are:  [place] Sleepiness Scale :Total score: 0       PHYSICAL EXAMINATION:  CONSTITUTIONAL:  Vitals:    07/24/25 1158   BP: 100/68   Pulse: 60   SpO2: 95%   Weight: 90.7 kg (200 lb)   Height: 165.1 cm (65\")    Body mass index is 33.28 kg/m².   NOSE: nasal passages are clear, No deformities noted   RESP SYSTEM: Not in any respiratory distress, no chest deformities noted,   CARDIOVASULAR: No edema noted  NEURO: Oriented x 3, gait normal,  Mood and affect appeared appropriate      Data reviewed:  The Smart card downloaded on 7/24/2025 has been reviewed independently by me for compliance and discussed the data with the patient.   Compliance; 97%  More than 4 hr use, 97%  Average use of the device 7 hours and 40-minute per night  Residual AHI: 0.8 /hr (Optimal < 5/hr, Good <10/hr, Adequate reduce by 75% from " baseline)  Mask type: Nasal mask but wants to try nasal cushion.  Device: ResMed  DME: [employer]  I have given her Torie nasal small size to try out and if she likes it then I can send the prescription to [facility]      ASSESSMENT AND PLAN:  Obstructive sleep apnea ( G 47.33).  The symptoms of sleep apnea have improved with the device and the treatment.  Patient's compliance with the device is excellent for treatment of sleep apnea.  I have independently reviewed the smart card down load and discussed with the patient the download data and encouarged the patient to continue to use the device.The residual AHI is acceptable. The device is benefiting the patient and the device is medically necessary.  Without proper control of sleep apnea and good compliance there is a increased risk for hypertension, diabetes mellitus and nonrestorative sleep with hypersomnia which can increase risk for motor vehicle accidents.  Untreated sleep apnea is also a risk factor for development of atrial fibrillation, pulmonary hypertension, insulin resistance and stroke. The patient is also instructed to get the supplies from the DME company and and change them on a regular basis.  A prescription for supplies has been sent to the DME company.  I have also discussed the good sleep hygiene habits and adequate amount of sleep needed for good health.  Obesity  1 with BMI is Body mass index is 33.28 kg/m².. I have discuss the relationship between the weight and sleep apnea. The benefit of weight loss in reducing severity of sleep apnea was discussed. Discussed diet and exercise with the patient to achieve ideal BMI.  Return in about 1 year (around 7/24/2026) for with smart card down load. . Patient's questions were answered.    7/24/2025  Franco Siegel MD  Sleep Medicine.  Medical Director,   [facility] sleep centers.